# Patient Record
Sex: FEMALE | Race: BLACK OR AFRICAN AMERICAN | NOT HISPANIC OR LATINO | URBAN - METROPOLITAN AREA
[De-identification: names, ages, dates, MRNs, and addresses within clinical notes are randomized per-mention and may not be internally consistent; named-entity substitution may affect disease eponyms.]

---

## 2019-09-14 ENCOUNTER — EMERGENCY (EMERGENCY)
Facility: HOSPITAL | Age: 29
LOS: 1 days | Discharge: HOME | End: 2019-09-14
Admitting: EMERGENCY MEDICINE
Payer: SELF-PAY

## 2019-09-14 VITALS
HEART RATE: 62 BPM | OXYGEN SATURATION: 97 % | TEMPERATURE: 98 F | DIASTOLIC BLOOD PRESSURE: 77 MMHG | RESPIRATION RATE: 16 BRPM | SYSTOLIC BLOOD PRESSURE: 142 MMHG

## 2019-09-14 DIAGNOSIS — R11.2 NAUSEA WITH VOMITING, UNSPECIFIED: ICD-10-CM

## 2019-09-14 DIAGNOSIS — R11.10 VOMITING, UNSPECIFIED: ICD-10-CM

## 2019-09-14 PROCEDURE — 99053 MED SERV 10PM-8AM 24 HR FAC: CPT

## 2019-09-14 PROCEDURE — 99284 EMERGENCY DEPT VISIT MOD MDM: CPT

## 2019-09-14 RX ORDER — ONDANSETRON 8 MG/1
4 TABLET, FILM COATED ORAL ONCE
Refills: 0 | Status: COMPLETED | OUTPATIENT
Start: 2019-09-14 | End: 2019-09-14

## 2019-09-14 RX ADMIN — ONDANSETRON 4 MILLIGRAM(S): 8 TABLET, FILM COATED ORAL at 01:54

## 2019-09-14 NOTE — ED PROVIDER NOTE - CLINICAL SUMMARY MEDICAL DECISION MAKING FREE TEXT BOX
28 y/o F presenting with c/o vomiting. Will order Zofran and reassess afterwards. 30 y/o F presenting with c/o vomiting. Will order Zofran and reassess afterwards.  pt refusing medications. refusing to PO challenge. uncooperative. will discharge.

## 2019-09-14 NOTE — ED PROVIDER NOTE - OBJECTIVE STATEMENT
30 y/o F with no PMHx presents to the ED with c/o vomiting. Pt reports she had at least 4 episodes of vomiting and decided to go to the ED for evaluation. She denies any Abd pain, fever, chills, or hematemesis.

## 2019-09-14 NOTE — ED ADULT TRIAGE NOTE - CHIEF COMPLAINT QUOTE
[place] complaining of nausea/vomiting for one day. Pt denies alcohol/substance use on arrival.

## 2019-09-14 NOTE — ED PROVIDER NOTE - PATIENT PORTAL LINK FT
You can access the FollowMyHealth Patient Portal offered by [facility] by registering at the following website: [email or web address]. By joining [employer]’s FollowMyHealth portal, you will also be able to view your health information using other applications (apps) compatible with our system.

## 2019-09-14 NOTE — ED PROVIDER NOTE - PROGRESS NOTE DETAILS
rechecked pt to PO challenge but she had refused her zofran. she is sleeping comfortably. abd continues to be soft, nontender. will d/c.

## 2019-09-14 NOTE — ED ADULT NURSE REASSESSMENT NOTE - NS ED NURSE REASSESS COMMENT FT1
at discharge, pt was refusing and being escorted out, walking steady. Per security and [employer] on site, "she was walking fine and just threw herself on the floor." Pt was placed on the wheelchair, back to location. No injuries noted. Provider Tk hodges.

## 2019-09-14 NOTE — ED ADULT NURSE NOTE - OBJECTIVE STATEMENT
pt presents to the ED with complaints of multiple bouts of nonbloody nonbilious vomiting denies abd pain   pt uncooperative with exam and history   active vomtiing x1 in ED refusing to take meds